# Patient Record
Sex: MALE | Race: WHITE | HISPANIC OR LATINO
[De-identification: names, ages, dates, MRNs, and addresses within clinical notes are randomized per-mention and may not be internally consistent; named-entity substitution may affect disease eponyms.]

---

## 2018-11-07 ENCOUNTER — HOSPITAL (OUTPATIENT)
Dept: OTHER | Age: 73
End: 2018-11-07
Attending: FAMILY MEDICINE

## 2021-08-31 ENCOUNTER — APPOINTMENT (OUTPATIENT)
Dept: GASTROENTEROLOGY | Age: 76
End: 2021-08-31

## 2021-10-07 ENCOUNTER — TELEPHONE (OUTPATIENT)
Dept: GASTROENTEROLOGY | Facility: CLINIC | Age: 76
End: 2021-10-07

## 2021-10-07 ENCOUNTER — OFFICE VISIT (OUTPATIENT)
Dept: GASTROENTEROLOGY | Facility: CLINIC | Age: 76
End: 2021-10-07
Payer: MEDICARE

## 2021-10-07 VITALS
SYSTOLIC BLOOD PRESSURE: 140 MMHG | WEIGHT: 182 LBS | HEIGHT: 70 IN | HEART RATE: 80 BPM | BODY MASS INDEX: 26.05 KG/M2 | DIASTOLIC BLOOD PRESSURE: 80 MMHG

## 2021-10-07 DIAGNOSIS — R11.0 NAUSEA: ICD-10-CM

## 2021-10-07 DIAGNOSIS — R10.13 EPIGASTRIC PAIN: Primary | ICD-10-CM

## 2021-10-07 PROCEDURE — 3077F SYST BP >= 140 MM HG: CPT | Performed by: INTERNAL MEDICINE

## 2021-10-07 PROCEDURE — 99203 OFFICE O/P NEW LOW 30 MIN: CPT | Performed by: INTERNAL MEDICINE

## 2021-10-07 PROCEDURE — 3079F DIAST BP 80-89 MM HG: CPT | Performed by: INTERNAL MEDICINE

## 2021-10-07 PROCEDURE — 3008F BODY MASS INDEX DOCD: CPT | Performed by: INTERNAL MEDICINE

## 2021-10-07 RX ORDER — ATORVASTATIN CALCIUM 10 MG/1
10 TABLET, FILM COATED ORAL DAILY
COMMUNITY
Start: 2021-09-27

## 2021-10-07 RX ORDER — TRAMADOL HYDROCHLORIDE 50 MG/1
50 TABLET ORAL 4 TIMES DAILY
COMMUNITY
Start: 2021-10-02

## 2021-10-07 RX ORDER — TRAZODONE HYDROCHLORIDE 150 MG/1
150 TABLET ORAL DAILY
COMMUNITY
Start: 2021-09-13

## 2021-10-07 RX ORDER — ONDANSETRON 4 MG/1
1 TABLET, ORALLY DISINTEGRATING ORAL AS NEEDED
COMMUNITY
Start: 2021-09-02

## 2021-10-07 RX ORDER — AMLODIPINE BESYLATE 10 MG/1
50 TABLET ORAL DAILY
COMMUNITY
Start: 2021-09-02

## 2021-10-07 NOTE — PROGRESS NOTES
Caitlin Raymundo is a 77year old male.     HPI:   Patient presents with:  Nausea: abdominal pain, last colon and egd was 4 yrs ago in [facility]    The patient is a 13-year-old male who has a history of chronic GI issues including chronic nausea and Medication Sig Dispense Refill   • traMADol 50 MG Oral Tab Take 50 mg by mouth 4 (four) times daily. • traZODone 150 MG Oral Tab Take 150 mg by mouth daily. • amLODIPine 10 MG Oral Tab Take 50 mg by mouth daily.      • atorvastatin 10 MG Oral Tab MD  [facility]

## 2021-10-07 NOTE — PATIENT INSTRUCTIONS
Nausea/fatigue  Constipation  - CT scan abdomen /pelvis  - EGD with MAC  - get lab work from prior PCP

## 2021-10-08 NOTE — TELEPHONE ENCOUNTER
Scheduled for:  Tanner Huang   Provider Name:    Date:  11/12/21  Location:  [facility]   Sedation:  Mac  Time:  3:00 Pm (pt is aware to arrive at 2:00 Pm)   Prep: Egd  Prep instructions were given to pt in the office, pt verbalized understanding.    Meds/All

## 2021-10-11 ENCOUNTER — TELEPHONE (OUTPATIENT)
Dept: CASE MANAGEMENT | Age: 76
End: 2021-10-11

## 2021-10-11 NOTE — TELEPHONE ENCOUNTER
Hi Dr. Patricia Calzada,    Can you please close you OV notes from 10/7/21 so I can obtain prior for the CT you ordered for [facility].     Thank you  Cayla Beckwith

## 2021-10-13 NOTE — TELEPHONE ENCOUNTER
Dr. Ousmane Hayes    The Prior Authorization department is trying to get authorization on the CT order that was placed on 10/7/2021.   Please complete office visit note because it is needed for our prior authorization request.    Thank you

## 2021-10-14 NOTE — TELEPHONE ENCOUNTER
Tana Reed completed office visit note. Please assist with prior authorization for CT scan. Patient is scheduled next Wednesday.     Thank you

## 2021-10-14 NOTE — TELEPHONE ENCOUNTER
Noted.  Thank you. Your Appointments    Wednesday October 20, 2021 12:30 PM  CT ABDOMEN PELVIS WITH CONTRAST with [facility] OF THE [facility] CT [address] ([facility]) [place] [address]  [address] [id number]  [phone]

## 2021-10-14 NOTE — TELEPHONE ENCOUNTER
Hi,  The case has been worked today and is authorized.   Thanks,  Ibis Mckenzie, [facility]

## 2021-10-20 ENCOUNTER — HOSPITAL ENCOUNTER (OUTPATIENT)
Dept: CT IMAGING | Facility: HOSPITAL | Age: 76
Discharge: HOME | End: 2021-10-20
Attending: INTERNAL MEDICINE
Payer: MEDICARE

## 2021-10-20 DIAGNOSIS — R10.13 EPIGASTRIC PAIN: ICD-10-CM

## 2021-10-20 PROCEDURE — 82565 ASSAY OF CREATININE: CPT

## 2021-10-20 PROCEDURE — 74177 CT ABD & PELVIS W/CONTRAST: CPT | Performed by: INTERNAL MEDICINE

## 2021-10-21 ENCOUNTER — TELEPHONE (OUTPATIENT)
Dept: GASTROENTEROLOGY | Facility: CLINIC | Age: 76
End: 2021-10-21

## 2021-10-21 NOTE — TELEPHONE ENCOUNTER
----- Message from Dioni Angeles MD sent at 10/20/2021  6:01 PM CDT -----  CT scan showed no acute findings.    Bile duct enlarged - RN to have the pt get lab work and if abnormal then MRCP scan     RN to forward to PCP and for input on bladder ( possibl

## 2021-10-21 NOTE — TELEPHONE ENCOUNTER
I reviewed below complete message with Jarvis Pina over the phone and he voiced understanding. He will go to our lab here at [facility] on Saturday to get the labs drawn. He told me his PCP is Dr. Chato Aranda.   I faxed the report to Dr. Shari Harrison at

## 2021-10-26 ENCOUNTER — LAB ENCOUNTER (OUTPATIENT)
Dept: LAB | Facility: HOSPITAL | Age: 76
End: 2021-10-26
Attending: INTERNAL MEDICINE
Payer: MEDICARE

## 2021-10-26 DIAGNOSIS — R10.13 EPIGASTRIC PAIN: ICD-10-CM

## 2021-10-26 PROCEDURE — 83690 ASSAY OF LIPASE: CPT

## 2021-10-26 PROCEDURE — 36415 COLL VENOUS BLD VENIPUNCTURE: CPT

## 2021-10-26 PROCEDURE — 80053 COMPREHEN METABOLIC PANEL: CPT

## 2021-10-26 PROCEDURE — 85025 COMPLETE CBC W/AUTO DIFF WBC: CPT

## 2021-11-01 ENCOUNTER — TELEPHONE (OUTPATIENT)
Dept: GASTROENTEROLOGY | Facility: CLINIC | Age: 76
End: 2021-11-01

## 2021-11-01 NOTE — TELEPHONE ENCOUNTER
----- Message from Curtis Cartwright MD sent at 10/29/2021  5:37 PM CDT -----  Lab work shows the blood sugar just above 100, liver tests normal and kidney function ok.   Normal pancreas test. Blood counts normal.

## 2021-11-10 ENCOUNTER — LAB ENCOUNTER (OUTPATIENT)
Dept: LAB | Facility: HOSPITAL | Age: 76
End: 2021-11-10
Attending: INTERNAL MEDICINE
Payer: MEDICARE

## 2021-11-10 DIAGNOSIS — Z01.818 PRE-OP TESTING: ICD-10-CM

## 2021-11-12 ENCOUNTER — HOSPITAL ENCOUNTER (OUTPATIENT)
Facility: HOSPITAL | Age: 76
Setting detail: HOSPITAL OUTPATIENT SURGERY
Discharge: HOME | End: 2021-11-12
Attending: INTERNAL MEDICINE | Admitting: INTERNAL MEDICINE
Payer: MEDICARE

## 2021-11-12 ENCOUNTER — ANESTHESIA (OUTPATIENT)
Dept: ENDOSCOPY | Facility: HOSPITAL | Age: 76
End: 2021-11-12
Payer: MEDICARE

## 2021-11-12 ENCOUNTER — ANESTHESIA EVENT (OUTPATIENT)
Dept: ENDOSCOPY | Facility: HOSPITAL | Age: 76
End: 2021-11-12
Payer: MEDICARE

## 2021-11-12 VITALS
OXYGEN SATURATION: 96 % | TEMPERATURE: 98 F | BODY MASS INDEX: 25.48 KG/M2 | HEART RATE: 78 BPM | WEIGHT: 178 LBS | DIASTOLIC BLOOD PRESSURE: 79 MMHG | SYSTOLIC BLOOD PRESSURE: 124 MMHG | HEIGHT: 70 IN | RESPIRATION RATE: 20 BRPM

## 2021-11-12 DIAGNOSIS — K44.9 HIATAL HERNIA: ICD-10-CM

## 2021-11-12 DIAGNOSIS — Z01.818 PRE-OP TESTING: Primary | ICD-10-CM

## 2021-11-12 DIAGNOSIS — R10.13 EPIGASTRIC PAIN: ICD-10-CM

## 2021-11-12 DIAGNOSIS — K29.70 GASTRITIS: ICD-10-CM

## 2021-11-12 DIAGNOSIS — R11.0 NAUSEA: ICD-10-CM

## 2021-11-12 PROCEDURE — 0DB68ZX EXCISION OF STOMACH, VIA NATURAL OR ARTIFICIAL OPENING ENDOSCOPIC, DIAGNOSTIC: ICD-10-PCS | Performed by: INTERNAL MEDICINE

## 2021-11-12 PROCEDURE — 43239 EGD BIOPSY SINGLE/MULTIPLE: CPT | Performed by: INTERNAL MEDICINE

## 2021-11-12 PROCEDURE — 0DB48ZX EXCISION OF ESOPHAGOGASTRIC JUNCTION, VIA NATURAL OR ARTIFICIAL OPENING ENDOSCOPIC, DIAGNOSTIC: ICD-10-PCS | Performed by: INTERNAL MEDICINE

## 2021-11-12 RX ORDER — SODIUM CHLORIDE, SODIUM LACTATE, POTASSIUM CHLORIDE, CALCIUM CHLORIDE 600; 310; 30; 20 MG/100ML; MG/100ML; MG/100ML; MG/100ML
INJECTION, SOLUTION INTRAVENOUS CONTINUOUS
Status: DISCONTINUED | OUTPATIENT
Start: 2021-11-12 | End: 2021-11-12

## 2021-11-12 RX ORDER — NALOXONE HYDROCHLORIDE 0.4 MG/ML
80 INJECTION, SOLUTION INTRAMUSCULAR; INTRAVENOUS; SUBCUTANEOUS AS NEEDED
Status: DISCONTINUED | OUTPATIENT
Start: 2021-11-12 | End: 2021-11-12

## 2021-11-12 RX ORDER — LIDOCAINE HYDROCHLORIDE 10 MG/ML
INJECTION, SOLUTION EPIDURAL; INFILTRATION; INTRACAUDAL; PERINEURAL AS NEEDED
Status: DISCONTINUED | OUTPATIENT
Start: 2021-11-12 | End: 2021-11-12 | Stop reason: SURG

## 2021-11-12 RX ORDER — GLYCOPYRROLATE 0.2 MG/ML
INJECTION, SOLUTION INTRAMUSCULAR; INTRAVENOUS AS NEEDED
Status: DISCONTINUED | OUTPATIENT
Start: 2021-11-12 | End: 2021-11-12 | Stop reason: SURG

## 2021-11-12 RX ADMIN — LIDOCAINE HYDROCHLORIDE 50 MG: 10 INJECTION, SOLUTION EPIDURAL; INFILTRATION; INTRACAUDAL; PERINEURAL at 15:32:00

## 2021-11-12 RX ADMIN — GLYCOPYRROLATE 0.2 MG: 0.2 INJECTION, SOLUTION INTRAMUSCULAR; INTRAVENOUS at 15:32:00

## 2021-11-12 RX ADMIN — SODIUM CHLORIDE, SODIUM LACTATE, POTASSIUM CHLORIDE, CALCIUM CHLORIDE: 600; 310; 30; 20 INJECTION, SOLUTION INTRAVENOUS at 15:29:00

## 2021-11-12 RX ADMIN — SODIUM CHLORIDE, SODIUM LACTATE, POTASSIUM CHLORIDE, CALCIUM CHLORIDE: 600; 310; 30; 20 INJECTION, SOLUTION INTRAVENOUS at 15:45:00

## 2021-11-12 NOTE — H&P
History & Physical Examination    Patient Name: Betzaida Dukes  MRN: [id number]  CSN: [id number]  YOB: 1945    Diagnosis:     Abdominal pain  Nausea       traMADol 50 MG Oral Tab, Take 50 mg by mouth 4 (four) times daily. , Disp: , Rfl: , 11/11/ None

## 2021-11-12 NOTE — OPERATIVE REPORT
[facility] Endoscopy Report      Preoperative Diagnosis:  - nausea  - abdominal pain      Postoperative Diagnosis:  - small hiatal hernia  - gastritis      Procedure:    Esophagogastroduodenoscopy       Surgeon:  DAVID Scott

## 2021-11-12 NOTE — ANESTHESIA PREPROCEDURE EVALUATION
Anesthesia PreOp Note    HPI:     Sofia Espinoza is a 77year old male who presents for preoperative consultation requested by: Anabell Lopez MD    Date of Surgery: 11/12/2021    Procedure(s):  ESOPHAGOGASTRODUODENOSCOPY (EGD)  Indication: Epigastric augusto Smokeless tobacco: Never Used    Vaping Use      Vaping Use: Never used    Substance and Sexual Activity      Alcohol use: Never      Drug use: Never      Sexual activity: Not on file    Other Topics      Concerns:        Not on file    Social History Narr disease, renal disease, bowel prep    Endo/Other - negative ROS   Abdominal  - normal exam             Anesthesia Plan:   ASA:  2  Plan:   MAC  Informed Consent Plan and Risks Discussed With:  Patient and spouse  Discussed plan with:  Surgeon      I have i

## 2021-11-12 NOTE — ANESTHESIA POSTPROCEDURE EVALUATION
Patient: Bessie Amezquita    Procedure Summary     Date: 11/12/21 Room / Location: [facility] ENDOSCOPY 01 / [facility] ENDOSCOPY    Anesthesia Start: 5685 Anesthesia Stop: 1904    Procedure: ESOPHAGOGASTRODUODENOSCOPY (EGD) (N/A ) Diagnosis:       Epigastric pain      Naus

## 2021-11-15 ENCOUNTER — TELEPHONE (OUTPATIENT)
Dept: GASTROENTEROLOGY | Facility: CLINIC | Age: 76
End: 2021-11-15

## 2021-11-15 DIAGNOSIS — R11.0 NAUSEA: Primary | ICD-10-CM

## 2021-11-17 ENCOUNTER — TELEPHONE (OUTPATIENT)
Dept: GASTROENTEROLOGY | Facility: CLINIC | Age: 76
End: 2021-11-17

## 2021-11-17 NOTE — TELEPHONE ENCOUNTER
----- Message from Dioni Angeles MD sent at 11/17/2021  4:33 PM CST -----  EGD showed a small hiatal hernia and gastritis ( samples taken negative for h pylori infection)

## 2021-11-17 NOTE — TELEPHONE ENCOUNTER
I tried to call patient to review below result note. Someone picked up the phone, never said \"hello\" or any response and then after several seconds hung up. Will try to call back at a later time.

## 2021-11-17 NOTE — TELEPHONE ENCOUNTER
Dr. Jessee Palmer    Please advise on results of pathology-result now available in Epic.     Thank you

## 2021-11-18 NOTE — TELEPHONE ENCOUNTER
I reviewed below complete result note with the patient over the phone. He voiced understanding.     He wanted to know how to set up the gastric emptying study-I provided him with the number to central scheduling which he wrote down and will call to sched

## 2021-11-22 ENCOUNTER — TELEPHONE (OUTPATIENT)
Dept: GASTROENTEROLOGY | Facility: CLINIC | Age: 76
End: 2021-11-22

## 2021-11-22 NOTE — TELEPHONE ENCOUNTER
I tried to call patient again to inform him that he needs to reschedule his gastric emptying study scheduled for tomorrow because he took the ultram.      The phone rang once, it sounded like someone answered the phone but no one was there after I said \"h

## 2021-11-22 NOTE — TELEPHONE ENCOUNTER
I spoke with Jasvir Grubbs in Nuclear Medicine.   Given that the patient is scheduled for a gastric emptying study to rule out gastroparesis and took Ultram, it would be ideal for the patient to reschedule and hold Ultram prior to the test to rule out the possibilit

## 2021-11-22 NOTE — TELEPHONE ENCOUNTER
Pt has scan scheduled for tomorrow and took  Tramadol rx today - this med is supposed to be stopped 2 days before procedure - dos he need to reschedule ?

## 2021-11-22 NOTE — TELEPHONE ENCOUNTER
Jenny Newsome out of office    I spoke to [facility] with Nuclear Medicine. Patient took Ultram today at 11am-this is usually to be held 2 days prior to test.   [facility] with Nuclear Medicine spoke to the patient.   It is up to the provider if ok to proceed wit

## 2021-11-22 NOTE — TELEPHONE ENCOUNTER
I tried to call Thao Leach x2. Both times it sounded like someone picked up the phone and was not on the line. I could not leave a voicemail.

## 2021-11-23 ENCOUNTER — HOSPITAL ENCOUNTER (OUTPATIENT)
Dept: NUCLEAR MEDICINE | Facility: HOSPITAL | Age: 76
End: 2021-11-23
Attending: INTERNAL MEDICINE
Payer: MEDICARE

## 2021-11-23 NOTE — TELEPHONE ENCOUNTER
Patient contacted and message below given. Patient states he is aware and has rescheduled for 11/30/2021.

## 2021-11-30 ENCOUNTER — HOSPITAL ENCOUNTER (OUTPATIENT)
Dept: NUCLEAR MEDICINE | Facility: HOSPITAL | Age: 76
Discharge: HOME | End: 2021-11-30
Attending: INTERNAL MEDICINE
Payer: MEDICARE

## 2021-11-30 DIAGNOSIS — R11.0 NAUSEA: ICD-10-CM

## 2021-11-30 PROCEDURE — 78264 GASTRIC EMPTYING IMG STUDY: CPT | Performed by: INTERNAL MEDICINE

## 2021-12-07 ENCOUNTER — TELEPHONE (OUTPATIENT)
Dept: GASTROENTEROLOGY | Facility: CLINIC | Age: 76
End: 2021-12-07

## 2021-12-08 NOTE — TELEPHONE ENCOUNTER
----- Message from Julieth Brock MD sent at 12/3/2021  6:24 PM CST -----  Stomach emptying ok, no evidence of gastroparesis. RN to inform of results.   The other option is to check motility study of the GI tract but this would have to be done at [facility]

## 2021-12-08 NOTE — TELEPHONE ENCOUNTER
Patient contacted, verified and results from Dr. Chloé Philip given. Patient voiced understanding. Patient states he does not want further studies done at this time.

## 2025-02-06 ENCOUNTER — LAB ENCOUNTER (OUTPATIENT)
Dept: LAB | Facility: HOSPITAL | Age: 80
End: 2025-02-06
Attending: INTERNAL MEDICINE
Payer: MEDICARE

## 2025-02-06 DIAGNOSIS — G31.84 MILD COGNITIVE IMPAIRMENT: ICD-10-CM

## 2025-02-06 LAB — VIT B12 SERPL-MCNC: 660 PG/ML (ref 211–911)

## 2025-02-06 PROCEDURE — 36415 COLL VENOUS BLD VENIPUNCTURE: CPT

## 2025-02-06 PROCEDURE — 82607 VITAMIN B-12: CPT

## 2025-02-06 PROCEDURE — 83520 IMMUNOASSAY QUANT NOS NONAB: CPT

## 2025-02-10 LAB
BETA-AMYLOID 40: 184.54 PG/ML
BETA-AMYLOID 40: 184.54 PG/ML
BETA-AMYLOID 42/40 RATIO: 0.1
BETA-AMYLOID 42/40 RATIO: 0.1
BETA-AMYLOID 42: 19.18 PG/ML
BETA-AMYLOID 42: 19.18 PG/ML

## 2025-02-11 ENCOUNTER — TELEPHONE (OUTPATIENT)
Age: 80
End: 2025-02-11

## 2025-02-11 NOTE — TELEPHONE ENCOUNTER
Georgette Chamberlain, [facility]  [facility]  [address] [address]  [phone]    Cat Blank, [facility]  [facility]  [address]  [facility] [address]  Phone: [phone]    [facility]  (Multiple providers)  [address]  [place] [address]  Phone: [phone]    Leeann Cuellar LCSW  [address] [place] [address] [phone]  [facility] [address]  Phone: [phone]

## 2025-06-02 ENCOUNTER — HOSPITAL ENCOUNTER (OUTPATIENT)
Dept: MRI IMAGING | Facility: HOSPITAL | Age: 80
Discharge: HOME | End: 2025-06-02
Attending: INTERNAL MEDICINE
Payer: MEDICARE

## 2025-06-02 DIAGNOSIS — G31.84 MILD COGNITIVE IMPAIRMENT: ICD-10-CM

## 2025-06-02 PROCEDURE — 70551 MRI BRAIN STEM W/O DYE: CPT | Performed by: INTERNAL MEDICINE

## (undated) DEVICE — KIT CLEAN ENDOKIT 1.1OZ GOWNX2

## (undated) DEVICE — MEDI-VAC NON-CONDUCTIVE SUCTION TUBING 6MM X 1.8M (6FT.) L: Brand: CARDINAL HEALTH

## (undated) DEVICE — LINE MNTR ADLT SET O2 INTMD

## (undated) DEVICE — 35 ML SYRINGE REGULAR TIP: Brand: MONOJECT

## (undated) DEVICE — CONMED SCOPE SAVER BITE BLOCK, 20X27 MM: Brand: SCOPE SAVER

## (undated) DEVICE — KIT ENDO ORCAPOD 160/180/190

## (undated) NOTE — LETTER
[facility], [place]  Authorization for Invasive Procedures  1.  I hereby authorize Dr. Nannette Crystal , my physician and whomever may be designated as the doctor's assistant, to perform the following operation and/or procedure:  Es occur: fever and allergic reactions, hemolytic reactions, transmission of disease such as hepatitis, AIDS, cytomegalovirus (CMV), and flluid overload.  In the event that I wish to have autologous transfusions of my own blood, or a directed donor transfusion Signature of Patient:  ________________________________________________ Date: _________Time: _________    Responsible person in case of minor or unconscious: _____________________________Relationship: ____________     Witness Signature: _______________

## (undated) NOTE — LETTER
[facility]  Administration of Anesthesia  1. Daniel William, or _________________________________ acting on his behalf, (Patient) (Dependent/Representative) request to receive anesthesia for my pending procedure/operation/treatment.   A ph bleeding, seizure, cardiac arrest and death. 7. AWARENESS: I understand that it is possible (but unlikely) to have explicit memory of events from the operating room while under general anesthesia.   8. ELECTROCONVULSIVE THERAPY PATIENTS: This consent serve below affirms that prior to the time of the procedure, I have explained to the patient and/or his/her guardian, the risks and benefits of undergoing anesthesia, as well as any reasonable alternatives.     ___________________________________________________